# Patient Record
(demographics unavailable — no encounter records)

---

## 2024-11-14 NOTE — CONSULT LETTER
[Dear  ___] : Dear  [unfilled], [Consult Letter:] : I had the pleasure of evaluating your patient, [unfilled]. [Please see my note below.] : Please see my note below. [Consult Closing:] : Thank you very much for allowing me to participate in the care of this patient.  If you have any questions, please do not hesitate to contact me. [Sincerely,] : Sincerely, [DrDelvin  ___] : Dr. ROMERO [FreeTextEntry3] : Micha Henderson MD Co-Director [facility]

## 2024-11-14 NOTE — PHYSICAL EXAM
[de-identified] : Tender over right basal joint more so than the STT joint.  Nontender A1 pulley nontender first dorsal apartment and a negative Finkelstein's test. [de-identified] : PA lateral oblique x-rays demonstrate mild right basal joint osteoarthritis as well as STT joint osteoarthritis.  There is a left distal radius malunion.

## 2024-11-14 NOTE — ASSESSMENT
[FreeTextEntry1] : Patient with symptomatic right basal joint osteoarthritis.  This been present for 2 months.  She desired a basal joint injection  My impression is that the patient has basal joint osteoarthritis. We discussed treatment options and she elected to undergo a right basal joint injection. The risks, benefits, and alternatives were discussed with the patient. This included, but was not limited to nerve injury, infection, subcutaneous atrophy, skin depigmentation etc. Under informed consent and sterile conditions the basal joint was injected with a combination of 1/2 cc Kenalog-10 and 1/2 cc of 2% plain lidocaine. It is my hopes that this significantly alleviates the patients symptoms.

## 2024-11-14 NOTE — HISTORY OF PRESENT ILLNESS
[Right] : right hand dominant [FreeTextEntry1] : Patient presents with right radial wrist and base of thumb pain for the past 2 months.  She denies any injury.  Patient has tried wearing a splint on the right wrist with minimal comfort.  She denies any discomfort on the left wrist.

## 2025-03-18 NOTE — PHYSICAL EXAM
[de-identified] : Tender over right basal joint more so than the STT joint.  Nontender A1 pulley nontender first dorsal apartment and a negative Finkelstein's test.

## 2025-03-18 NOTE — HISTORY OF PRESENT ILLNESS
[Right] : right hand dominant [FreeTextEntry1] : Patient presents for follow-up evaluation of right base of thumb pain worsening within the past 3 months.  No new acute trauma.  Patient received a right basal joint injection on November 14, 2024 which lasted about 1 month.  Patient is considering another injection today.

## 2025-03-18 NOTE — PHYSICAL EXAM
[de-identified] : Tender over right basal joint more so than the STT joint.  Nontender A1 pulley nontender first dorsal apartment and a negative Finkelstein's test.

## 2025-03-18 NOTE — ASSESSMENT
[FreeTextEntry1] : My impression is that the patient has basal joint osteoarthritis. We discussed treatment options and she elected to undergo a right basal joint injection. The risks, benefits, and alternatives were discussed with the patient. This included, but was not limited to nerve injury, infection, subcutaneous atrophy, skin depigmentation etc. Under informed consent and sterile conditions the basal joint was injected with a combination of 1/2 cc Kenalog-10 and 1/2 cc of 2% plain lidocaine. It is my hopes that this significantly alleviates the patients symptoms.  If symptoms persist she should consider right basal joint arthroplasty EPB to APL tendon transfer  The risks benefits and alternatives of the above surgery were discussed which included, but were not limited to nerve injury, infection, weakness, subsidence, failure requiring revision, infection etc.  Shared decision-making was undertaken.